# Patient Record
Sex: FEMALE | Race: WHITE | NOT HISPANIC OR LATINO | RURAL
[De-identification: names, ages, dates, MRNs, and addresses within clinical notes are randomized per-mention and may not be internally consistent; named-entity substitution may affect disease eponyms.]

---

## 2024-01-24 ENCOUNTER — OFFICE VISIT (OUTPATIENT)
Dept: BEHAVIORAL HEALTH | Facility: CLINIC | Age: 29
End: 2024-01-24
Payer: COMMERCIAL

## 2024-01-24 VITALS
OXYGEN SATURATION: 98 % | RESPIRATION RATE: 20 BRPM | DIASTOLIC BLOOD PRESSURE: 60 MMHG | BODY MASS INDEX: 38.51 KG/M2 | TEMPERATURE: 98 F | HEIGHT: 69 IN | HEART RATE: 90 BPM | WEIGHT: 260 LBS | SYSTOLIC BLOOD PRESSURE: 110 MMHG

## 2024-01-24 DIAGNOSIS — F90.2 ADHD (ATTENTION DEFICIT HYPERACTIVITY DISORDER), COMBINED TYPE: Primary | ICD-10-CM

## 2024-01-24 DIAGNOSIS — F43.12 CHRONIC POST-TRAUMATIC STRESS DISORDER (PTSD): ICD-10-CM

## 2024-01-24 DIAGNOSIS — F41.1 GENERALIZED ANXIETY DISORDER: ICD-10-CM

## 2024-01-24 LAB

## 2024-01-24 PROCEDURE — 1159F MED LIST DOCD IN RCRD: CPT | Mod: ,,, | Performed by: NURSE PRACTITIONER

## 2024-01-24 PROCEDURE — 90792 PSYCH DIAG EVAL W/MED SRVCS: CPT | Mod: ,,, | Performed by: NURSE PRACTITIONER

## 2024-01-24 PROCEDURE — 3074F SYST BP LT 130 MM HG: CPT | Mod: ,,, | Performed by: NURSE PRACTITIONER

## 2024-01-24 PROCEDURE — 3078F DIAST BP <80 MM HG: CPT | Mod: ,,, | Performed by: NURSE PRACTITIONER

## 2024-01-24 PROCEDURE — 80305 DRUG TEST PRSMV DIR OPT OBS: CPT | Mod: QW,,, | Performed by: NURSE PRACTITIONER

## 2024-01-24 RX ORDER — LISDEXAMFETAMINE DIMESYLATE 30 MG/1
30 CAPSULE ORAL EVERY MORNING
Qty: 30 CAPSULE | Refills: 0 | Status: SHIPPED | OUTPATIENT
Start: 2024-01-24 | End: 2024-01-24

## 2024-01-24 RX ORDER — BUPROPION HYDROCHLORIDE 150 MG/1
150 TABLET, EXTENDED RELEASE ORAL 2 TIMES DAILY
COMMUNITY
Start: 2024-01-12 | End: 2024-01-24

## 2024-01-24 RX ORDER — SERTRALINE HYDROCHLORIDE 100 MG/1
100 TABLET, FILM COATED ORAL
COMMUNITY
Start: 2023-11-17 | End: 2024-01-24 | Stop reason: SDUPTHER

## 2024-01-24 RX ORDER — BUPROPION HYDROCHLORIDE 150 MG/1
150 TABLET ORAL DAILY
Qty: 90 TABLET | Refills: 3 | Status: SHIPPED | OUTPATIENT
Start: 2024-01-24 | End: 2024-05-29

## 2024-01-24 RX ORDER — SERTRALINE HYDROCHLORIDE 100 MG/1
100 TABLET, FILM COATED ORAL DAILY
Qty: 90 TABLET | Refills: 3 | Status: SHIPPED | OUTPATIENT
Start: 2024-01-24 | End: 2025-01-18

## 2024-01-24 RX ORDER — NORETHINDRONE ACETATE AND ETHINYL ESTRADIOL 1MG-20(21)
1 KIT ORAL
COMMUNITY
Start: 2024-01-12 | End: 2024-05-19

## 2024-01-24 RX ORDER — DEXTROAMPHETAMINE SACCHARATE, AMPHETAMINE ASPARTATE MONOHYDRATE, DEXTROAMPHETAMINE SULFATE AND AMPHETAMINE SULFATE 5; 5; 5; 5 MG/1; MG/1; MG/1; MG/1
20 CAPSULE, EXTENDED RELEASE ORAL EVERY MORNING
Qty: 30 CAPSULE | Refills: 0 | Status: SHIPPED | OUTPATIENT
Start: 2024-01-24 | End: 2024-02-23 | Stop reason: SDUPTHER
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
## 2024-01-25 PROBLEM — F90.2 ADHD (ATTENTION DEFICIT HYPERACTIVITY DISORDER), COMBINED TYPE: Status: ACTIVE | Noted: 2024-01-25

## 2024-01-25 PROBLEM — F41.1 GENERALIZED ANXIETY DISORDER: Status: ACTIVE | Noted: 2024-01-25

## 2024-01-25 PROBLEM — F43.12 CHRONIC POST-TRAUMATIC STRESS DISORDER (PTSD): Status: ACTIVE | Noted: 2024-01-25

## 2024-01-26 ENCOUNTER — PATIENT MESSAGE (OUTPATIENT)
Dept: BEHAVIORAL HEALTH | Facility: CLINIC | Age: 29
End: 2024-01-26
Payer: COMMERCIAL

## 2024-02-17 ENCOUNTER — OFFICE VISIT (OUTPATIENT)
Dept: FAMILY MEDICINE | Facility: CLINIC | Age: 29
End: 2024-02-17
Payer: COMMERCIAL

## 2024-02-17 VITALS
HEIGHT: 69 IN | DIASTOLIC BLOOD PRESSURE: 82 MMHG | BODY MASS INDEX: 38.8 KG/M2 | HEART RATE: 114 BPM | RESPIRATION RATE: 18 BRPM | TEMPERATURE: 99 F | OXYGEN SATURATION: 97 % | SYSTOLIC BLOOD PRESSURE: 115 MMHG | WEIGHT: 262 LBS

## 2024-02-17 DIAGNOSIS — U07.1 COVID-19: ICD-10-CM

## 2024-02-17 DIAGNOSIS — Z20.828 EXPOSURE TO SARS-ASSOCIATED CORONAVIRUS: ICD-10-CM

## 2024-02-17 DIAGNOSIS — J02.0 STREPTOCOCCAL SORE THROAT: Primary | ICD-10-CM

## 2024-02-17 LAB
CTP QC/QA: YES
FLUAV AG NPH QL: NEGATIVE
FLUBV AG NPH QL: NEGATIVE
S PYO RRNA THROAT QL PROBE: POSITIVE
SARS-COV-2 AG RESP QL IA.RAPID: POSITIVE

## 2024-02-17 PROCEDURE — 87880 STREP A ASSAY W/OPTIC: CPT | Mod: QW,,, | Performed by: NURSE PRACTITIONER

## 2024-02-17 PROCEDURE — 99051 MED SERV EVE/WKEND/HOLIDAY: CPT | Mod: ,,, | Performed by: NURSE PRACTITIONER

## 2024-02-17 PROCEDURE — 87426 SARSCOV CORONAVIRUS AG IA: CPT | Mod: QW,,, | Performed by: NURSE PRACTITIONER

## 2024-02-17 PROCEDURE — 87804 INFLUENZA ASSAY W/OPTIC: CPT | Mod: 59,QW,, | Performed by: NURSE PRACTITIONER

## 2024-02-17 PROCEDURE — 3008F BODY MASS INDEX DOCD: CPT | Mod: ,,, | Performed by: NURSE PRACTITIONER

## 2024-02-17 PROCEDURE — 3074F SYST BP LT 130 MM HG: CPT | Mod: ,,, | Performed by: NURSE PRACTITIONER

## 2024-02-17 PROCEDURE — 99203 OFFICE O/P NEW LOW 30 MIN: CPT | Mod: ,,, | Performed by: NURSE PRACTITIONER

## 2024-02-17 PROCEDURE — 3079F DIAST BP 80-89 MM HG: CPT | Mod: ,,, | Performed by: NURSE PRACTITIONER

## 2024-02-17 RX ORDER — AZITHROMYCIN 500 MG/1
500 TABLET, FILM COATED ORAL DAILY
Qty: 5 TABLET | Refills: 0 | Status: SHIPPED | OUTPATIENT
Start: 2024-02-17 | End: 2024-02-22

## 2024-02-17 RX ORDER — AZITHROMYCIN 500 MG/1
500 TABLET, FILM COATED ORAL DAILY
Qty: 5 TABLET | Refills: 0 | Status: SHIPPED | OUTPATIENT
Start: 2024-02-17 | End: 2024-02-17

## 2024-02-17 NOTE — PROGRESS NOTES
Subjective:       Patient ID: Xenia Pradhan is a 28 y.o. female.    Chief Complaint: Sore Throat (X1 day) and Otalgia (Left ear pain x 1day)    Sore Throat (X1 day) and Otalgia (Left ear pain x 1day)      Sore Throat   Associated symptoms include ear pain. Pertinent negatives include no abdominal pain, congestion, coughing, headaches, neck pain, shortness of breath or vomiting.   Otalgia   Associated symptoms include a sore throat. Pertinent negatives include no abdominal pain, coughing, headaches, neck pain, rash or vomiting.     Review of Systems   Constitutional:  Negative for appetite change, fatigue and fever.   HENT:  Positive for ear pain and sore throat. Negative for nasal congestion.    Eyes:  Negative for pain, discharge and itching.   Respiratory:  Negative for cough and shortness of breath.    Cardiovascular:  Negative for chest pain and leg swelling.   Gastrointestinal:  Negative for abdominal pain, change in bowel habit, nausea and vomiting.   Musculoskeletal:  Negative for back pain, gait problem and neck pain.   Integumentary:  Negative for rash and wound.   Allergic/Immunologic: Negative for immunocompromised state.   Neurological:  Negative for dizziness, weakness and headaches.   All other systems reviewed and are negative.        Objective:      Physical Exam  Vitals and nursing note reviewed.   Constitutional:       General: She is not in acute distress.     Appearance: Normal appearance. She is not ill-appearing, toxic-appearing or diaphoretic.   HENT:      Head: Normocephalic.      Right Ear: Tympanic membrane, ear canal and external ear normal.      Left Ear: Tympanic membrane, ear canal and external ear normal.      Nose: Congestion present. No rhinorrhea.      Mouth/Throat:      Mouth: Mucous membranes are moist.      Pharynx: Posterior oropharyngeal erythema present. No oropharyngeal exudate.   Eyes:      General: No scleral icterus.        Right eye: No discharge.         Left eye: No  discharge.      Extraocular Movements: Extraocular movements intact.      Conjunctiva/sclera: Conjunctivae normal.      Pupils: Pupils are equal, round, and reactive to light.   Cardiovascular:      Rate and Rhythm: Normal rate and regular rhythm.      Pulses: Normal pulses.      Heart sounds: Normal heart sounds. No murmur heard.  Pulmonary:      Effort: Pulmonary effort is normal. No respiratory distress.      Breath sounds: Normal breath sounds. No wheezing, rhonchi or rales.   Musculoskeletal:         General: Normal range of motion.      Cervical back: Neck supple. Tenderness present.   Lymphadenopathy:      Cervical: Cervical adenopathy present.   Skin:     General: Skin is warm and dry.      Capillary Refill: Capillary refill takes less than 2 seconds.      Findings: No rash.   Neurological:      Mental Status: She is alert and oriented to person, place, and time.   Psychiatric:         Mood and Affect: Mood normal.         Behavior: Behavior normal.         Thought Content: Thought content normal.         Judgment: Judgment normal.            Assessment:       1. Streptococcal sore throat    2. Exposure to SARS-associated coronavirus    3. COVID-19        Plan:   Streptococcal sore throat  -     Discontinue: azithromycin (ZITHROMAX) 500 MG tablet; Take 1 tablet (500 mg total) by mouth once daily. for 5 days  Dispense: 5 tablet; Refill: 0  -     azithromycin (ZITHROMAX) 500 MG tablet; Take 1 tablet (500 mg total) by mouth once daily. for 5 days  Dispense: 5 tablet; Refill: 0    Exposure to SARS-associated coronavirus  -     POCT rapid strep A  -     SARS Coronavirus 2 Antigen, POCT  -     POCT Influenza A/B Rapid Antigen    COVID-19         Risks, benefits, and side effects were discussed with the patient. All questions were answered to the fullest satisfaction of the patient, and pt verbalized understanding and agreement to treatment plan. Pt was to call with any new or worsening symptoms, or present to the  ER

## 2024-02-23 ENCOUNTER — OFFICE VISIT (OUTPATIENT)
Dept: BEHAVIORAL HEALTH | Facility: CLINIC | Age: 29
End: 2024-02-23
Payer: COMMERCIAL

## 2024-02-23 VITALS
BODY MASS INDEX: 37.92 KG/M2 | HEIGHT: 69 IN | HEART RATE: 97 BPM | SYSTOLIC BLOOD PRESSURE: 112 MMHG | OXYGEN SATURATION: 99 % | DIASTOLIC BLOOD PRESSURE: 68 MMHG | RESPIRATION RATE: 20 BRPM | TEMPERATURE: 98 F | WEIGHT: 256 LBS

## 2024-02-23 DIAGNOSIS — F90.2 ADHD (ATTENTION DEFICIT HYPERACTIVITY DISORDER), COMBINED TYPE: ICD-10-CM

## 2024-02-23 DIAGNOSIS — F43.12 CHRONIC POST-TRAUMATIC STRESS DISORDER (PTSD): ICD-10-CM

## 2024-02-23 DIAGNOSIS — F41.1 GENERALIZED ANXIETY DISORDER: Primary | ICD-10-CM

## 2024-02-23 PROCEDURE — 99214 OFFICE O/P EST MOD 30 MIN: CPT | Mod: ,,, | Performed by: NURSE PRACTITIONER

## 2024-02-23 PROCEDURE — 1160F RVW MEDS BY RX/DR IN RCRD: CPT | Mod: ,,, | Performed by: NURSE PRACTITIONER

## 2024-02-23 PROCEDURE — 3078F DIAST BP <80 MM HG: CPT | Mod: ,,, | Performed by: NURSE PRACTITIONER

## 2024-02-23 PROCEDURE — 90833 PSYTX W PT W E/M 30 MIN: CPT | Mod: ,,, | Performed by: NURSE PRACTITIONER

## 2024-02-23 PROCEDURE — 3008F BODY MASS INDEX DOCD: CPT | Mod: ,,, | Performed by: NURSE PRACTITIONER

## 2024-02-23 PROCEDURE — 3074F SYST BP LT 130 MM HG: CPT | Mod: ,,, | Performed by: NURSE PRACTITIONER

## 2024-02-23 PROCEDURE — 1159F MED LIST DOCD IN RCRD: CPT | Mod: ,,, | Performed by: NURSE PRACTITIONER

## 2024-02-23 RX ORDER — DEXTROAMPHETAMINE SACCHARATE, AMPHETAMINE ASPARTATE MONOHYDRATE, DEXTROAMPHETAMINE SULFATE AND AMPHETAMINE SULFATE 5; 5; 5; 5 MG/1; MG/1; MG/1; MG/1
20 CAPSULE, EXTENDED RELEASE ORAL EVERY MORNING
Qty: 30 CAPSULE | Refills: 0 | Status: SHIPPED | OUTPATIENT
Start: 2024-03-25 | End: 2024-03-22

## 2024-02-23 RX ORDER — DEXTROAMPHETAMINE SACCHARATE, AMPHETAMINE ASPARTATE MONOHYDRATE, DEXTROAMPHETAMINE SULFATE AND AMPHETAMINE SULFATE 5; 5; 5; 5 MG/1; MG/1; MG/1; MG/1
20 CAPSULE, EXTENDED RELEASE ORAL EVERY MORNING
Qty: 30 CAPSULE | Refills: 0 | Status: SHIPPED | OUTPATIENT
Start: 2024-02-23 | End: 2024-04-25

## 2024-02-23 RX ORDER — DEXTROAMPHETAMINE SACCHARATE, AMPHETAMINE ASPARTATE MONOHYDRATE, DEXTROAMPHETAMINE SULFATE AND AMPHETAMINE SULFATE 5; 5; 5; 5 MG/1; MG/1; MG/1; MG/1
20 CAPSULE, EXTENDED RELEASE ORAL EVERY MORNING
Qty: 30 CAPSULE | Refills: 0 | Status: SHIPPED | OUTPATIENT
Start: 2024-04-24 | End: 2024-03-22

## 2024-02-23 NOTE — PROGRESS NOTES
Outpatient Psychiatry Follow-Up Visit (MD/NP)    2/23/2024    Clinical Status of Patient:  Outpatient (Ambulatory)    Chief Complaint:  Xenia Pradhan is a 28 y.o. female who presents today for follow-up of depression, anxiety, and attention problems.  Met with patient.      Interim Events/Subjective Report/Content of Current Session: Has had improvement in anxiety/depression symptoms since her last appointment. Feels like the 100 mg of Sertraline combined with Bupropion 150 mg has made a big difference in her overall anxiety and depression. She has also lost 6 lbs since her last appointment which she attributes to her medication regimen helping her history of Binge Eating Disorder.. Her job has gotten increasingly stressful, but she feels like she is managing the stress levels well. She has had substantial improvement in ADHD symptoms, but feels like her symptoms return around lunch time.    Psychotherapy:  Target symptoms: recurrent depression, anxiety , work stress  Why chosen therapy is appropriate versus another modality: relevant to diagnosis, patient responds to this modality  Outcome monitoring methods: self-report, observation, checklist/rating scale  Therapeutic intervention type: behavior modifying psychotherapy  Topics discussed/themes: relationships difficulties, difficulty managing affect in interpersonal relationships, building skills sets for symptom management, symptom recognition  The patient's response to the intervention is accepting. The patient's progress toward treatment goals is good.   Duration of intervention: 21 minutes.    Patient  reviewed this visit.     Review of Systems   PSYCHIATRIC: Pertinant items are noted in the narrative.  CONSTITUTIONAL: No weight gain or loss.   RESPIRATORY: No shortness of breath.  CARDIOVASCULAR: No tachycardia or chest pain.    Past Medical, Family and Social History: The patient's past medical, family and social history have been reviewed and updated as  appropriate within the electronic medical record - see encounter notes.    Compliance: yes    Side effects: None    Risk Parameters:  Patient reports no suicidal ideation  Patient reports no homicidal ideation  Patient reports no self-injurious behavior  Patient reports no violent behavior    Exam (detailed: at least 9 elements; comprehensive: all 15 elements)     PHQ-9: PHQ-9 Questionnaire  Little interest or pleasure in doing things: Not at all  Feeling down, depressed, or hopeless: Not at all  Trouble falling or staying asleep, or sleeping too much: Several days  Feeling tired or having little energy: Several days  Poor appetite or overeating: More than half the days  Feeling bad about yourself - or that you are a failure or have let yourself or your family down: Not at all  Trouble concentrating on things, such as reading the newspaper or watching television: Not at all  Moving or speaking so slowly that other people could have noticed? Or the opposite - being so fidgety or restless that you have been moving around a lot more than usual.: Not at all  Thoughts that you would be better off dead or hurting yourself in some way: Not at all  Patient Health Questionnaire-9 Score: 4    How difficult have these problems made it for you to do your work, take care of things at home, or get along with other people?: Somewhat difficult    GARLAND-7: GARLAND-7 Questionnaire  Feeling nervous, anxious, or on edge: Not at all  Not being able to stop or control worrying: Not at all  Worrying too much about different things: Several days  Trouble relaxing: More than half the days  Being so restless that it is hard to sit still: Not at all  Becoming easily annoyed or irritable: Several days  Feeling afraid as if something awful might happen: Not at all  GARLAND-7 Total Score: 4      Constitutional  Vitals:  Most recent vital signs, dated greater than 90 days prior to this appointment, were reviewed.   Vitals:    02/23/24 0923   BP: 112/68  "  Pulse: 97   Resp: 20   Temp: 97.6 °F (36.4 °C)   SpO2: 99%   Weight: 116.1 kg (256 lb)   Height: 5' 9" (1.753 m)        General:  unremarkable, age appropriate, casually dressed, neatly groomed, obese     Musculoskeletal  Muscle Strength/Tone:  no spasicity, no rigidity, no cogwheeling, no flaccidity, no paratonia, no dyskinesia   Gait & Station:  non-ataxic     Psychiatric  Speech:  no latency; no press   Mood & Affect:  steady  congruent and appropriate   Thought Process:  normal and logical   Associations:  intact   Thought Content:  normal, no suicidality, no homicidality, delusions, or paranoia   Insight:  intact, has awareness of illness   Judgement: behavior is adequate to circumstances   Orientation:  grossly intact   Memory: intact for content of interview   Language: grossly intact   Attention Span & Concentration:  able to focus   Fund of Knowledge:  intact and appropriate to age and level of education, familiar with aspects of current personal life     Assessment and Diagnosis   Status/Progress: Based on the examination today, the patient's problem(s) is/are improved.  New problems have not been presented today.   Co-morbidities, Diagnostic uncertainty, and Lack of compliance are not complicating management of the primary condition.  There are no active rule-out diagnoses for this patient at this time.     General Impression: Has had good symptom improvement in every category. Will increase Dextroamphetamine-amphetamine to 20 mg XR for longer control of ADHD symptoms. Is using coping mechanisms well.      ICD-10-CM ICD-9-CM   1. Generalized anxiety disorder  F41.1 300.02   2. ADHD (attention deficit hyperactivity disorder), combined type  F90.2 314.01   3. Chronic post-traumatic stress disorder (PTSD)  F43.12 309.81       Intervention/Counseling/Treatment Plan   Medication Management: Continue current medications. The risks and benefits of medication were discussed with the patient. Verbalized " understanding.  Counseling provided with patient as follows: importance of compliance with chosen treatment options was emphasized, risks and benefits of treatment options, including medications, were discussed with the patient, risk factor reduction, prognosis, patient education, instructions for  management, treatment, and follow-up were reviewed      Medications:   Medication List with Changes/Refills   New Medications    DEXTROAMPHETAMINE-AMPHETAMINE (ADDERALL XR) 20 MG 24 HR CAPSULE    Take 1 capsule (20 mg total) by mouth every morning.       Start Date: 3/25/2024 End Date: --    DEXTROAMPHETAMINE-AMPHETAMINE (ADDERALL XR) 20 MG 24 HR CAPSULE    Take 1 capsule (20 mg total) by mouth every morning.       Start Date: 2/23/2024 End Date: --   Current Medications    BUPROPION (WELLBUTRIN XL) 150 MG TB24 TABLET    Take 1 tablet (150 mg total) by mouth once daily.       Start Date: 1/24/2024 End Date: 1/18/2025    NORETHINDRONE-ETHINYL ESTRADIOL (JUNEL FE 1/20) 1 MG-20 MCG (21)/75 MG (7) PER TABLET    Take 1 tablet by mouth.       Start Date: 1/12/2024 End Date: --    SERTRALINE (ZOLOFT) 100 MG TABLET    Take 1 tablet (100 mg total) by mouth once daily.       Start Date: 1/24/2024 End Date: 1/18/2025   Changed and/or Refilled Medications    Modified Medication Previous Medication    DEXTROAMPHETAMINE-AMPHETAMINE (ADDERALL XR) 20 MG 24 HR CAPSULE dextroamphetamine-amphetamine (ADDERALL XR) 20 MG 24 hr capsule       Take 1 capsule (20 mg total) by mouth every morning.    Take 1 capsule (20 mg total) by mouth every morning.       Start Date: 4/24/2024 End Date: --    Start Date: 1/24/2024 End Date: 2/23/2024     Return to Clinic: 3 months    Patient instructed to please go to emergency department if feeling as though you are going to harm to yourself or others or if you are in crisis; or to please call the clinic to report any worsening of symptoms or problems associated with medication.     Total Time: 46 minutes

## 2024-03-22 ENCOUNTER — PATIENT MESSAGE (OUTPATIENT)
Dept: BEHAVIORAL HEALTH | Facility: CLINIC | Age: 29
End: 2024-03-22
Payer: COMMERCIAL

## 2024-03-22 DIAGNOSIS — F90.2 ADHD (ATTENTION DEFICIT HYPERACTIVITY DISORDER), COMBINED TYPE: Primary | ICD-10-CM

## 2024-03-22 RX ORDER — DEXTROAMPHETAMINE SACCHARATE, AMPHETAMINE ASPARTATE MONOHYDRATE, DEXTROAMPHETAMINE SULFATE AND AMPHETAMINE SULFATE 6.25; 6.25; 6.25; 6.25 MG/1; MG/1; MG/1; MG/1
25 CAPSULE, EXTENDED RELEASE ORAL EVERY MORNING
Qty: 30 CAPSULE | Refills: 0 | Status: SHIPPED | OUTPATIENT
Start: 2024-04-24 | End: 2024-04-25

## 2024-03-22 RX ORDER — DEXTROAMPHETAMINE SACCHARATE, AMPHETAMINE ASPARTATE MONOHYDRATE, DEXTROAMPHETAMINE SULFATE AND AMPHETAMINE SULFATE 6.25; 6.25; 6.25; 6.25 MG/1; MG/1; MG/1; MG/1
25 CAPSULE, EXTENDED RELEASE ORAL EVERY MORNING
Qty: 30 CAPSULE | Refills: 0 | Status: SHIPPED | OUTPATIENT
Start: 2024-03-25 | End: 2024-04-25

## 2024-04-25 RX ORDER — ATOMOXETINE 40 MG/1
40 CAPSULE ORAL DAILY
Qty: 30 CAPSULE | Refills: 1 | Status: SHIPPED | OUTPATIENT
Start: 2024-04-25 | End: 2024-05-06 | Stop reason: SDUPTHER

## 2024-05-01 ENCOUNTER — PATIENT MESSAGE (OUTPATIENT)
Dept: BEHAVIORAL HEALTH | Facility: CLINIC | Age: 29
End: 2024-05-01
Payer: COMMERCIAL

## 2024-05-06 ENCOUNTER — OFFICE VISIT (OUTPATIENT)
Dept: BEHAVIORAL HEALTH | Facility: CLINIC | Age: 29
End: 2024-05-06
Payer: COMMERCIAL

## 2024-05-06 VITALS
BODY MASS INDEX: 40.88 KG/M2 | TEMPERATURE: 97 F | OXYGEN SATURATION: 98 % | SYSTOLIC BLOOD PRESSURE: 122 MMHG | HEART RATE: 95 BPM | HEIGHT: 69 IN | DIASTOLIC BLOOD PRESSURE: 84 MMHG | RESPIRATION RATE: 16 BRPM | WEIGHT: 276 LBS

## 2024-05-06 DIAGNOSIS — F41.1 GENERALIZED ANXIETY DISORDER: Primary | ICD-10-CM

## 2024-05-06 DIAGNOSIS — F90.2 ADHD (ATTENTION DEFICIT HYPERACTIVITY DISORDER), COMBINED TYPE: ICD-10-CM

## 2024-05-06 DIAGNOSIS — F43.12 CHRONIC POST-TRAUMATIC STRESS DISORDER (PTSD): ICD-10-CM

## 2024-05-06 PROCEDURE — 1159F MED LIST DOCD IN RCRD: CPT | Mod: ,,, | Performed by: NURSE PRACTITIONER

## 2024-05-06 PROCEDURE — 1160F RVW MEDS BY RX/DR IN RCRD: CPT | Mod: ,,, | Performed by: NURSE PRACTITIONER

## 2024-05-06 PROCEDURE — 99214 OFFICE O/P EST MOD 30 MIN: CPT | Mod: ,,, | Performed by: NURSE PRACTITIONER

## 2024-05-06 PROCEDURE — 90833 PSYTX W PT W E/M 30 MIN: CPT | Mod: ,,, | Performed by: NURSE PRACTITIONER

## 2024-05-06 PROCEDURE — 3079F DIAST BP 80-89 MM HG: CPT | Mod: ,,, | Performed by: NURSE PRACTITIONER

## 2024-05-06 PROCEDURE — 3008F BODY MASS INDEX DOCD: CPT | Mod: ,,, | Performed by: NURSE PRACTITIONER

## 2024-05-06 PROCEDURE — 3074F SYST BP LT 130 MM HG: CPT | Mod: ,,, | Performed by: NURSE PRACTITIONER

## 2024-05-06 RX ORDER — CLORAZEPATE DIPOTASSIUM 3.75 MG/1
3.75 TABLET ORAL 3 TIMES DAILY PRN
Qty: 60 TABLET | Refills: 0 | Status: SHIPPED | OUTPATIENT
Start: 2024-05-06 | End: 2024-05-29

## 2024-05-06 RX ORDER — ATOMOXETINE 40 MG/1
40 CAPSULE ORAL 2 TIMES DAILY
Qty: 60 CAPSULE | Refills: 2 | Status: SHIPPED | OUTPATIENT
Start: 2024-05-06

## 2024-05-06 RX ORDER — SERTRALINE HYDROCHLORIDE 50 MG/1
50 TABLET, FILM COATED ORAL DAILY
Qty: 90 TABLET | Refills: 3 | Status: SHIPPED | OUTPATIENT
Start: 2024-05-06 | End: 2025-05-06

## 2024-05-06 NOTE — PROGRESS NOTES
Outpatient Psychiatry Follow-Up Visit (MD/NP)    5/6/2024    Clinical Status of Patient:  Outpatient (Ambulatory)    Chief Complaint:  Xenia Pradhan is a 28 y.o. female who presents today for follow-up of depression and anxiety.  Met with patient.      Interim Events/Subjective Report/Content of Current Session: Has had a really big increase in anxiety.  joined the  and now he is gone. He won't return for a couple of months. She is concerned about his absence. He graduates June 6th and then he goes to [employer] in [place] and he won't return until August. She can see him in 2 days while she is in [place] and then she won't see him again. She is very upset with him being gone.     Psychotherapy:  Target symptoms: depression, anxiety   Why chosen therapy is appropriate versus another modality: relevant to diagnosis, patient responds to this modality  Outcome monitoring methods: self-report, observation, checklist/rating scale  Therapeutic intervention type: supportive psychotherapy  Topics discussed/themes: relationships difficulties, work stress, parenting issues, difficulty managing affect in interpersonal relationships, building skills sets for symptom management, symptom recognition  The patient's response to the intervention is accepting. The patient's progress toward treatment goals is good.   Duration of intervention: 22 minutes.      Patient  reviewed this visit.     Review of Systems   PSYCHIATRIC: Pertinant items are noted in the narrative.  CONSTITUTIONAL: No weight gain or loss.   RESPIRATORY: No shortness of breath.  CARDIOVASCULAR: No tachycardia or chest pain.    Past Medical, Family and Social History: The patient's past medical, family and social history have been reviewed and updated as appropriate within the electronic medical record - see encounter notes.    Compliance: yes    Side effects: None    Risk Parameters:  Patient reports no suicidal ideation  Patient reports no  homicidal ideation  Patient reports no self-injurious behavior  Patient reports no violent behavior    Exam (detailed: at least 9 elements; comprehensive: all 15 elements)     GARLAND-7 Questionnaire  Feeling nervous, anxious, or on edge: Nearly every day  Not being able to stop or control worrying: More than half the days  Worrying too much about different things: Several days  Trouble relaxing: More than half the days  Being so restless that it is hard to sit still: Several days  Becoming easily annoyed or irritable: Nearly every day  Feeling afraid as if something awful might happen: More than half the days  GARLAND-7 Total Score: 14           5/6/2024     4:36 PM   MDQ Scale   you felt so good or so hyper that other people thought you were not your normal self or you were so hyper that you got into trouble? 0   you were so irritable that you shouted at people or started fights or arguments? 1   you felt much more self-confident than usual? 1   you got much less sleep than usual and found that you didn't really miss it? 1   you were more talkative or spoke much faster than usual? 1   thoughts raced through your head or you couldn't slow your mind down? 1   you were so easily distracted by things around you that you had trouble concentrating or staying on track? 1   you had more energy than usual? 0   you were much more active or did many more things than usual? 0   you were much more social or outgoing than usual, for example, you telephoned friends in the middle of the night? 0   you were much more interested in sex than usual? 0   you did things that were unusual for you or that other people might have thought were excessive, foolish, or risky? 0   spending money got you or your family in trouble? 0   If you checked YES to more than one of the above, have several of these ever happened during the same period of time? 1   How much of a problem did any of these cause you - like being unable to work; having family, money  or legal troubles; getting into arguments or fights? Moderate problem   Mood Disorder Questionnaire Score  6         5/6/2024     4:38 PM   Adult ADHD Self-Report Scale   How often do you have trouble wrapping up the final details of a project once the chanllenging parts have been done? 1   How often do you have difficulty getting things in order when you have to do a task that requires organization? 3   How often do you have problems remembering appointments or obligations? 2   When you have a task that requires a lot of thought, how often do you avoid or delay getting started? 3   How often do you fidget or squirm with your hands or feet when you have to sit down for a long time? 2   How often do you feel overly active and compelled to do things, like you were driven by a motor? 2   Part A Score 13   How often do you make careless mistakes when you have to work on a boring or difficult project? 2   How often do you have difficulty keeping your attention when you are doing boring or repetitive work? 4   How often do you have difficulty concentrating on what people say to you, even when they are speaking to you directly? 2   How often do you misplace or have difficulty finding things at home or at work? 2   How often are you distracted by activity or noise around you? 3   How often do you leave your seat in meetings or other situations in which you are expected to remain seated? 2   How often do you feel restless or fidgety? 2   How often do you have difficulty unwinding and relaxing when you have time to yourself? 2   How often do you find yourself talking too much when you are in social situations? 2   When you're in a conversation, how often do you find yourself finishing the sentences of the people you are talking to before they can finish them themselves? 2   How often do you have difficulty waiting your turn in situations when turn taking is required? 1   How often do you interrupt others when they are busy? 1  "  Part B Score 25         Constitutional  Vitals:  Most recent vital signs, dated greater than 90 days prior to this appointment, were reviewed.   Vitals:    05/06/24 1623   BP: 122/84   Pulse: 95   Resp: 16   Temp: 97.2 °F (36.2 °C)   SpO2: 98%   Weight: 125.2 kg (276 lb)   Height: 5' 9" (1.753 m)        General:  unremarkable, age appropriate, older than stated age, obese     Musculoskeletal  Muscle Strength/Tone:  no spasicity, no rigidity, no cogwheeling, no flaccidity, no paratonia   Gait & Station:  non-ataxic     Psychiatric  Speech:  no latency; no press   Mood & Affect:  anxious, sad  congruent and appropriate   Thought Process:  normal and logical   Associations:  intact   Thought Content:  normal, no suicidality, no homicidality, delusions, or paranoia   Insight:  intact, has awareness of illness   Judgement: behavior is adequate to circumstances   Orientation:  grossly intact   Memory: intact for content of interview   Language: grossly intact   Attention Span & Concentration:  able to focus   Fund of Knowledge:  intact and appropriate to age and level of education, familiar with aspects of current personal life     Assessment and Diagnosis   Status/Progress: Based on the examination today, the patient's problem(s) is/are adequately but not ideally controlled.  New problems have not been presented today.   Co-morbidities, Diagnostic uncertainty, and Lack of compliance are not complicating management of the primary condition.  There are no active rule-out diagnoses for this patient at this time.     General Impression: Is having some adjustment to her 's absence and this will likely improve after his schooling is over in August. Will increase medication regimen and begin clorazepate to compensate with the increase in anxiety.       ICD-10-CM ICD-9-CM   1. Generalized anxiety disorder  F41.1 300.02   2. Chronic post-traumatic stress disorder (PTSD)  F43.12 309.81   3. ADHD (attention deficit " hyperactivity disorder), combined type  F90.2 314.01       Intervention/Counseling/Treatment Plan   Medication Management: Continue current medications. The risks and benefits of medication were discussed with the patient. Verbalized understanding.  Counseling provided with patient as follows: importance of compliance with chosen treatment options was emphasized, risks and benefits of treatment options, including medications, were discussed with the patient, risk factor reduction, prognosis, patient education, instructions for  management, treatment, and follow-up were reviewed      Medications:   Medication List with Changes/Refills   New Medications    CLORAZEPATE (TRANXENE) 3.75 MG TAB    Take 1 tablet (3.75 mg total) by mouth 3 (three) times daily as needed (anxiety).       Start Date: 5/6/2024  End Date: 5/26/2024    SERTRALINE (ZOLOFT) 50 MG TABLET    Take 1 tablet (50 mg total) by mouth once daily. Take with 100 mg tablet for 150 mg total.       Start Date: 5/6/2024  End Date: 5/6/2025   Current Medications    BUPROPION (WELLBUTRIN XL) 150 MG TB24 TABLET    Take 1 tablet (150 mg total) by mouth once daily.       Start Date: 1/24/2024 End Date: 1/18/2025    NORETHINDRONE-ETHINYL ESTRADIOL (JUNEL FE 1/20) 1 MG-20 MCG (21)/75 MG (7) PER TABLET    Take 1 tablet by mouth.       Start Date: 1/12/2024 End Date: --    SERTRALINE (ZOLOFT) 100 MG TABLET    Take 1 tablet (100 mg total) by mouth once daily.       Start Date: 1/24/2024 End Date: 1/18/2025   Changed and/or Refilled Medications    Modified Medication Previous Medication    ATOMOXETINE (STRATTERA) 40 MG CAPSULE atomoxetine (STRATTERA) 40 MG capsule       Take 1 capsule (40 mg total) by mouth 2 (two) times a day.    Take 1 capsule (40 mg total) by mouth once daily.       Start Date: 5/6/2024  End Date: --    Start Date: 4/25/2024 End Date: 5/6/2024     Return to Clinic: 3 months    Patient instructed to please go to emergency department if feeling as though  you are going to harm to yourself or others or if you are in crisis; or to please call the clinic to report any worsening of symptoms or problems associated with medication.     Total Time: 48 minutes

## 2024-05-29 ENCOUNTER — OFFICE VISIT (OUTPATIENT)
Dept: FAMILY MEDICINE | Facility: CLINIC | Age: 29
End: 2024-05-29
Payer: COMMERCIAL

## 2024-05-29 VITALS
OXYGEN SATURATION: 97 % | BODY MASS INDEX: 39.55 KG/M2 | DIASTOLIC BLOOD PRESSURE: 82 MMHG | TEMPERATURE: 99 F | HEIGHT: 69 IN | RESPIRATION RATE: 20 BRPM | WEIGHT: 267 LBS | SYSTOLIC BLOOD PRESSURE: 127 MMHG | HEART RATE: 76 BPM

## 2024-05-29 DIAGNOSIS — Z11.59 NEED FOR HEPATITIS C SCREENING TEST: ICD-10-CM

## 2024-05-29 DIAGNOSIS — E55.9 VITAMIN D DEFICIENCY: ICD-10-CM

## 2024-05-29 DIAGNOSIS — Z13.1 SCREENING FOR DIABETES MELLITUS: ICD-10-CM

## 2024-05-29 DIAGNOSIS — R94.6 THYROID FUNCTION STUDY ABNORMALITY: ICD-10-CM

## 2024-05-29 DIAGNOSIS — Z11.4 SCREENING FOR HIV (HUMAN IMMUNODEFICIENCY VIRUS): ICD-10-CM

## 2024-05-29 DIAGNOSIS — E66.9 OBESITY, CLASS II, BMI 35-39.9, NO COMORBIDITY: Chronic | ICD-10-CM

## 2024-05-29 DIAGNOSIS — Z79.899 ENCOUNTER FOR LONG-TERM (CURRENT) USE OF OTHER MEDICATIONS: ICD-10-CM

## 2024-05-29 DIAGNOSIS — R00.2 PALPITATIONS: ICD-10-CM

## 2024-05-29 DIAGNOSIS — Z00.00 ROUTINE GENERAL MEDICAL EXAMINATION AT A HEALTH CARE FACILITY: Primary | ICD-10-CM

## 2024-05-29 DIAGNOSIS — Z13.220 SCREENING FOR LIPOID DISORDERS: ICD-10-CM

## 2024-05-29 PROBLEM — F90.2 ADHD (ATTENTION DEFICIT HYPERACTIVITY DISORDER), COMBINED TYPE: Chronic | Status: ACTIVE | Noted: 2024-01-25

## 2024-05-29 PROBLEM — F43.12 CHRONIC POST-TRAUMATIC STRESS DISORDER (PTSD): Chronic | Status: ACTIVE | Noted: 2024-01-25

## 2024-05-29 PROBLEM — U07.1 COVID-19: Status: RESOLVED | Noted: 2024-02-17 | Resolved: 2024-05-29

## 2024-05-29 PROBLEM — F41.1 GENERALIZED ANXIETY DISORDER: Chronic | Status: ACTIVE | Noted: 2024-01-25

## 2024-05-29 PROBLEM — Z20.828 EXPOSURE TO SARS-ASSOCIATED CORONAVIRUS: Status: RESOLVED | Noted: 2024-02-17 | Resolved: 2024-05-29

## 2024-05-29 PROBLEM — J02.0 STREPTOCOCCAL SORE THROAT: Status: RESOLVED | Noted: 2024-02-17 | Resolved: 2024-05-29

## 2024-05-29 PROCEDURE — 1159F MED LIST DOCD IN RCRD: CPT | Mod: ,,, | Performed by: NURSE PRACTITIONER

## 2024-05-29 PROCEDURE — 3008F BODY MASS INDEX DOCD: CPT | Mod: ,,, | Performed by: NURSE PRACTITIONER

## 2024-05-29 PROCEDURE — 99395 PREV VISIT EST AGE 18-39: CPT | Mod: ,,, | Performed by: NURSE PRACTITIONER

## 2024-05-29 PROCEDURE — 3079F DIAST BP 80-89 MM HG: CPT | Mod: ,,, | Performed by: NURSE PRACTITIONER

## 2024-05-29 PROCEDURE — 3074F SYST BP LT 130 MM HG: CPT | Mod: ,,, | Performed by: NURSE PRACTITIONER

## 2024-05-29 PROCEDURE — 1160F RVW MEDS BY RX/DR IN RCRD: CPT | Mod: ,,, | Performed by: NURSE PRACTITIONER

## 2024-05-29 NOTE — PROGRESS NOTES
[facility] - FAMILY MEDICINE       PATIENT NAME: Xenia Pradhan   : 1995    AGE: 28 y.o. DATE OF ENCOUNTER: 24    MRN: [id number]    Subjective     Patient ID: Xenia Pradhan is a 28 y.o. female.    Chief Complaint: Establish Care (Patient presents to clinic to establish care.) and Health Maintenance ([facility] for . /Tetanus vaccine had done in  at end of the year.)    HPI  Presents to establish care. Eligible for HY wellness visit, will RTC fasting in am for labs.    Has problems losing weight; wt fluctuating since senior in high school.   Recent palps w/ resting HR in 90s but in the past few days back down to 70s.  Saw cardiology when in college for same and was told stress/anxiety.    Went to Weight Management for about 6 mths.  Wellbutrin helped but not sure if it contributed to palps so she stopped it; it helps with cravings and desire to binge eat.  Barrington Cheatham NP had her on adderall for ADHD, but she stopped it due to heart racing & changed to strattera; for anxiety he recently added 50 mg sertraline for total 150 mg daily; added tranxene prn.    Review of Systems   Constitutional:  Negative for chills and fever.   HENT: Negative.     Respiratory: Negative.     Cardiovascular:  Positive for palpitations. Negative for chest pain, leg swelling and claudication.   Gastrointestinal: Negative.    Genitourinary: Negative.    Musculoskeletal: Negative.    Integumentary:  Negative.   Neurological: Negative.    Psychiatric/Behavioral:  Positive for decreased concentration. Negative for dysphoric mood, self-injury and suicidal ideas. The patient is not nervous/anxious.        Tobacco Use: Low Risk  (2024)    Patient History     Smoking Tobacco Use: Never     Smokeless Tobacco Use: Never     Passive Exposure: Never     Review of patient's allergies indicates:   Allergen Reactions    Keflex [cephalexin] Rash     Current Outpatient Medications   Medication  "Instructions    atomoxetine (STRATTERA) 40 mg, Oral, 2 times daily    clorazepate (TRANXENE) 3.75 mg, Oral, 3 times daily PRN    sertraline (ZOLOFT) 100 mg, Oral, Daily    sertraline (ZOLOFT) 50 mg, Oral, Daily, Take with 100 mg tablet for 150 mg total.     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      Vitamin D deficiency 05/29/2024     Health Maintenance Topics with due status: Not Due       Topic Last Completion Date    TETANUS VACCINE 10/01/2022    Influenza Vaccine Not Due     Immunization History   Administered Date(s) Administered    Tdap 10/01/2022       Objective     Body mass index is 39.43 kg/m².  Wt Readings from Last 3 Encounters:   05/29/24 121.1 kg (267 lb)   05/06/24 125.2 kg (276 lb)   02/23/24 116.1 kg (256 lb)     Ht Readings from Last 3 Encounters:   05/29/24 5' 9" (1.753 m)   05/06/24 5' 9" (1.753 m)   02/23/24 5' 9" (1.753 m)     BP Readings from Last 3 Encounters:   05/29/24 127/82   05/06/24 122/84   02/23/24 112/68     Temp Readings from Last 3 Encounters:   05/29/24 98.6 °F (37 °C) (Oral)   05/06/24 97.2 °F (36.2 °C)   02/23/24 97.6 °F (36.4 °C)     Pulse Readings from Last 3 Encounters:   05/29/24 76   05/06/24 95   02/23/24 97     Resp Readings from Last 3 Encounters:   05/29/24 20   05/06/24 16   02/23/24 20     PF Readings from Last 3 Encounters:   No data found for PF       Physical Exam  Vitals and nursing note reviewed.   Constitutional:       General: She is not in acute distress.     Appearance: Normal appearance. She " is not ill-appearing.   HENT:      Head: Normocephalic.      Right Ear: Tympanic membrane, ear canal and external ear normal.      Left Ear: Tympanic membrane, ear canal and external ear normal.      Nose: Nose normal.      Mouth/Throat:      Mouth: Mucous membranes are moist.      Pharynx: Oropharynx is clear. Uvula midline. No posterior oropharyngeal erythema or uvula swelling.   Eyes:      Conjunctiva/sclera: Conjunctivae normal.      Pupils: Pupils are equal, round, and reactive to light.   Neck:      Thyroid: No thyroid mass or thyromegaly.      Vascular: Normal carotid pulses. No carotid bruit.   Cardiovascular:      Rate and Rhythm: Normal rate and regular rhythm.      Pulses: Normal pulses.      Heart sounds: Normal heart sounds.   Pulmonary:      Effort: Pulmonary effort is normal. No respiratory distress.      Breath sounds: Normal breath sounds. No wheezing, rhonchi or rales.   Abdominal:      Palpations: Abdomen is soft.      Tenderness: There is no abdominal tenderness.   Musculoskeletal:      Cervical back: Neck supple.      Right lower leg: No edema.      Left lower leg: No edema.   Lymphadenopathy:      Cervical: No cervical adenopathy.   Skin:     General: Skin is warm and dry.      Capillary Refill: Capillary refill takes less than 2 seconds.      Coloration: Skin is not jaundiced or pale.   Neurological:      General: No focal deficit present.      Mental Status: She is alert and oriented to person, place, and time.      Gait: Gait normal.   Psychiatric:         Mood and Affect: Mood normal.         Behavior: Behavior normal.         Assessment and Plan     1. Routine general medical examination at a health care facility    2. Screening for diabetes mellitus  -     Hemoglobin A1C; Future; Expected date: 05/30/2024    3. Screening for lipoid disorders  -     Lipid Panel; Future; Expected date: 05/30/2024    4. Obesity, Class II, BMI 35-39.9, no comorbidity  Assessment & Plan:  Wt Readings from Last  3 Encounters:   05/29/24 1358 121.1 kg (267 lb)   05/06/24 1623 125.2 kg (276 lb)   02/23/24 0923 116.1 kg (256 lb)     BMI Readings from Last 3 Encounters:   05/29/24 39.43 kg/m²   05/06/24 40.76 kg/m²   02/23/24 37.80 kg/m²     Obesity complicates all aspects of disease management from diagnostic modalities to treatment. Weight loss encouraged and discussed pharmacological treatments that are options in conjunction with therapeutic lifestyle changes if covered by insurance.    Advised would not recommend Adipex or Qysmia due to her history of palpitations.  She has tried Contrave without long term benefit compared to cost of med and was unable to titrate dose > 2 tabs per day.    Suggested considering Saxenda [though currently on nationwide backorder], Wegovy or Zepbound if covered by insurance to use in conjunction with healthy, reduced calorie diet and exercise.     Patient has experienced weight regain following initial success on lifestyle therapy alone and weight tends to fluctuate up & down.  In accordance with ACE/AACE guidelines, she would greatly benefit from adding medication management with lifestyle therapy.    Goal: to lose 5% of body weight or 13.5 lbs in the first 12 wks of treatment and a long term weight loss goal of 10% or more body weight to improve and prevent weight related co-morbidities and overall health and well-being.    If med is started, would need monthly weight management visit for monitoring risk vs benefit, dosage titration, and progress monitoring until reaching maintenance dose and assured tolerance of med.    Orders:  -     Hemoglobin A1C; Future; Expected date: 05/30/2024    5. Vitamin D deficiency  Assessment & Plan:   Latest Reference Range & Units 04/20/23 09:20   Vitamin D ng/mL 19.3     Recheck Vit D level.    Orders:  -     Vitamin D; Future; Expected date: 05/30/2024    6. Thyroid function study abnormality  Assessment & Plan:  Lab Results   Component Value Date    TSH  2.480 04/20/2023    [id number] 121 04/20/2023    FREET4 0.69 (L) 04/20/2023     Recheck thyroid function.    Orders:  -     T4, Free; Future; Expected date: 05/30/2024  -     TSH; Future; Expected date: 05/30/2024  -     T3, Free; Future; Expected date: 05/30/2024    7. Palpitations  Overview:  Eval per cardiology for palps when in college and was attributed to stress/anxiety.    Assessment & Plan:  Recent palps w/ resting HR in 90s currently resolved with HR in 70s.    Orders:  -     Comprehensive Metabolic Panel; Future; Expected date: 05/30/2024  -     T4, Free; Future; Expected date: 05/30/2024  -     TSH; Future; Expected date: 05/30/2024  -     T3, Free; Future; Expected date: 05/30/2024    8. Screening for HIV (human immunodeficiency virus)  -     HIV 1/2 Ag/Ab (4th Gen); Future; Expected date: 05/30/2024    9. Need for hepatitis C screening test  -     Hepatitis C Antibody; Future; Expected date: 05/30/2024    10. Encounter for long-term (current) use of other medications  -     CBC Auto Differential; Future; Expected date: 05/30/2024  -     Comprehensive Metabolic Panel; Future; Expected date: 05/30/2024  -     Hemoglobin A1C; Future; Expected date: 05/30/2024       Plan:   Screening labs plus any other labs needed for long term monitoring.  Additional treatment plan as above.  Has appt w/ GYN Dr. Bhardwaj for 6/19/24.        I have reviewed the medications, allergies, and problem list.     Goal Actions:    What type of visit is the patient here for today?: Healthy You  Does the patient consent to enroll in [facility] Healthy?:  (no [facility] benefits)  Is this a Wellness Follow Up?: No  What is your overall wellness goal? (select at least one): Lose weight  Choose 3: Biometric, Lifestyle, Nutrition  Biometric Actions: BMI>30 lose 1-2 lbs per week  Lifestyle Actions : Take medications as prescribed  Nurtrition Actions: Recommend weight loss      Follow up in about 1 year (around 6/2/2025) for Healthy You, with fasting  labs; sooner f/u if needed &/or if wt loss med started.    Signature:  MANUEL Jaime-BC    Future Appointments   Date Time Provider Department Center   8/12/2024  3:30 PM [facility]   6/2/2025  3:00 PM Olga Adam FNP [facility]

## 2024-05-29 NOTE — ASSESSMENT & PLAN NOTE
Lab Results   Component Value Date    TSH 2.480 04/20/2023    [id number] 121 04/20/2023    FREET4 0.69 (L) 04/20/2023     Recheck thyroid function.

## 2024-05-29 NOTE — ASSESSMENT & PLAN NOTE
Wt Readings from Last 3 Encounters:   05/29/24 1358 121.1 kg (267 lb)   05/06/24 1623 125.2 kg (276 lb)   02/23/24 0923 116.1 kg (256 lb)     BMI Readings from Last 3 Encounters:   05/29/24 39.43 kg/m²   05/06/24 40.76 kg/m²   02/23/24 37.80 kg/m²     Obesity complicates all aspects of disease management from diagnostic modalities to treatment. Weight loss encouraged and discussed pharmacological treatments that are options in conjunction with therapeutic lifestyle changes if covered by insurance.    Advised would not recommend Adipex or Qysmia due to her history of palpitations.  She has tried Contrave without long term benefit compared to cost of med and was unable to titrate dose > 2 tabs per day.    Suggested considering Saxenda [though currently on nationwide backorder], Wegovy or Zepbound if covered by insurance to use in conjunction with healthy, reduced calorie diet and exercise.     Patient has experienced weight regain following initial success on lifestyle therapy alone and weight tends to fluctuate up & down.  In accordance with ACE/AACE guidelines, she would greatly benefit from adding medication management with lifestyle therapy.    Goal: to lose 5% of body weight or 13.5 lbs in the first 12 wks of treatment and a long term weight loss goal of 10% or more body weight to improve and prevent weight related co-morbidities and overall health and well-being.    If med is started, would need monthly weight management visit for monitoring risk vs benefit, dosage titration, and progress monitoring until reaching maintenance dose and assured tolerance of med.

## 2024-06-03 ENCOUNTER — PATIENT MESSAGE (OUTPATIENT)
Dept: FAMILY MEDICINE | Facility: CLINIC | Age: 29
End: 2024-06-03
Payer: COMMERCIAL

## 2024-06-11 ENCOUNTER — PATIENT MESSAGE (OUTPATIENT)
Dept: FAMILY MEDICINE | Facility: CLINIC | Age: 29
End: 2024-06-11
Payer: COMMERCIAL

## 2024-08-01 ENCOUNTER — PATIENT MESSAGE (OUTPATIENT)
Dept: FAMILY MEDICINE | Facility: CLINIC | Age: 29
End: 2024-08-01
Payer: COMMERCIAL

## 2024-08-01 DIAGNOSIS — E66.9 OBESITY, CLASS II, BMI 35-39.9, NO COMORBIDITY: Primary | Chronic | ICD-10-CM

## 2024-08-07 RX ORDER — SEMAGLUTIDE 0.5 MG/.5ML
0.5 INJECTION, SOLUTION SUBCUTANEOUS
Qty: 2 ML | Refills: 0 | Status: SHIPPED | OUTPATIENT
Start: 2024-08-07

## 2024-08-08 NOTE — ASSESSMENT & PLAN NOTE
Wt Readings from Last 3 Encounters:   05/29/24 1358 121.1 kg (267 lb)   05/06/24 1623 125.2 kg (276 lb)   02/23/24 0923 116.1 kg (256 lb)     BMI Readings from Last 3 Encounters:   05/29/24 39.43 kg/m²   05/06/24 40.76 kg/m²   02/23/24 37.80 kg/m²     Obesity complicates all aspects of disease management from diagnostic modalities to treatment. Weight loss encouraged and discussed pharmacological treatments that are options in conjunction with therapeutic lifestyle changes if covered by insurance.      Past treatments tried include:  -Weight management clinic supervision for approximately 6 months.  -Sessions with a therapist/nutritionist specializing in eating disorders who recommended a low calorie (7406-2025) diet which was not successful and unable to maintain.  -Stimulants in the past for ADHD (Mydayis, Adderall, and Vyvanse) without significant weight loss 5 lbs max) and cannot take stimulants due to causing increased BP and palpitations.  Therefore, would not recommend Adipex or Qysmia.    -Took Contrave twice in past without long term benefit compared to cost of med and was unable to titrate dose > 2 tabs per day due to side effects.  -Wellbutrin/bupropion helped with cravings and binge eating, but contributed to palps.     Saxenda not available to due medication availability issues related to nationwide backorder.  Recommend Wegovy or Zepbound if covered by insurance to use in conjunction with healthy, reduced calorie diet and exercise.     Patient has experienced weight regain following initial success on lifestyle therapy alone and weight tends to fluctuate up & down; is unable to maintain weight loss.  In accordance with ACE/AACE guidelines, she would greatly benefit from adding medication management with lifestyle therapy.    Goal: to lose 5% of body weight or 13.5 lbs in the first 12 wks of treatment and a long term weight loss goal of 10% or more body weight to improve and prevent weight related  co-morbidities and overall health and well-being.    If med is started, would need monthly weight management visit for monitoring risk vs benefit, dosage titration, and progress monitoring until reaching maintenance dose and assured tolerance of med.

## 2024-08-30 ENCOUNTER — PATIENT MESSAGE (OUTPATIENT)
Dept: BEHAVIORAL HEALTH | Facility: CLINIC | Age: 29
End: 2024-08-30
Payer: COMMERCIAL

## 2024-09-03 ENCOUNTER — PATIENT MESSAGE (OUTPATIENT)
Dept: FAMILY MEDICINE | Facility: CLINIC | Age: 29
End: 2024-09-03
Payer: COMMERCIAL

## 2024-09-20 ENCOUNTER — TELEPHONE (OUTPATIENT)
Dept: OBSTETRICS AND GYNECOLOGY | Facility: CLINIC | Age: 29
End: 2024-09-20
Payer: COMMERCIAL

## 2024-09-20 DIAGNOSIS — Z34.01 ENCOUNTER FOR PRENATAL CARE OF FIRST PREGNANCY, FIRST TRIMESTER: Primary | ICD-10-CM

## 2024-09-20 NOTE — TELEPHONE ENCOUNTER
----- Message from Hamida Richter MA sent at 9/20/2024  9:27 AM CDT -----  Who Called: Xenia Pradhan    Caller is requesting a sooner appointment. Caller declined first available appointment listed below. Caller will not accept being placed on the waitlist and is requesting a message be sent to doctor.    When is the first available appointment?10-7      Preferred Method of Contact: Phone Call  Patient's Preferred Phone Number on File: [phone]   Best Call Back Number, if different:   Additional Information: Positive preg test, last preg she had a miss carriage early on, wants to come in and do blood work

## 2024-09-23 ENCOUNTER — TELEPHONE (OUTPATIENT)
Dept: OBSTETRICS AND GYNECOLOGY | Facility: CLINIC | Age: 29
End: 2024-09-23
Payer: OTHER GOVERNMENT

## 2024-09-23 DIAGNOSIS — Z32.01 POSITIVE URINE PREGNANCY TEST: Primary | ICD-10-CM

## 2024-09-23 DIAGNOSIS — Z87.59 HISTORY OF MOLAR PREGNANCY: ICD-10-CM

## 2024-09-23 NOTE — TELEPHONE ENCOUNTER
----- Message from Amy Baker MA sent at 9/23/2024  8:40 AM CDT -----  Regarding: pt asking to have a Beta draw  Pt is asking if she can come in and get a Beta Draw, her last pregnancy was a partial molar- her appt isnt til 10/7/24- her phone number is [phone] leave message if no answer.

## 2024-09-24 ENCOUNTER — TELEPHONE (OUTPATIENT)
Dept: OBSTETRICS AND GYNECOLOGY | Facility: CLINIC | Age: 29
End: 2024-09-24
Payer: OTHER GOVERNMENT

## 2024-09-24 DIAGNOSIS — Z87.59 HISTORY OF MOLAR PREGNANCY: Primary | ICD-10-CM

## 2024-09-24 NOTE — TELEPHONE ENCOUNTER
----- Message from Hamida Richter MA sent at 9/23/2024  4:56 PM CDT -----  Who Called: Xenia Pradhan    Caller is requesting information on test results.    Name of Test (Lab/Mammo/Etc): HCG      Preferred Method of Contact: Phone Call  Patient's Preferred Phone Number on File: [phone]   Best Call Back Number, if different:  Additional Information: test results

## 2024-09-25 ENCOUNTER — TELEPHONE (OUTPATIENT)
Dept: OBSTETRICS AND GYNECOLOGY | Facility: CLINIC | Age: 29
End: 2024-09-25
Payer: OTHER GOVERNMENT

## 2024-09-25 NOTE — TELEPHONE ENCOUNTER
----- Message from Hamida Richter MA sent at 9/25/2024 10:29 AM CDT -----  Who Called: Xenia Pradhan    Caller is requesting information on test results.    Name of Test (Lab/Mammo/Etc): HCG blood draw    Preferred Method of Contact: Phone Call  Patient's Preferred Phone Number on File: [phone]   Best Call Back Number, if different:  Additional Information: results of hcg

## 2024-10-22 ENCOUNTER — TELEPHONE (OUTPATIENT)
Dept: OBSTETRICS AND GYNECOLOGY | Facility: CLINIC | Age: 29
End: 2024-10-22
Payer: OTHER GOVERNMENT

## 2024-10-22 DIAGNOSIS — Z32.01 POSITIVE URINE PREGNANCY TEST: Primary | ICD-10-CM

## 2024-10-24 ENCOUNTER — PATIENT MESSAGE (OUTPATIENT)
Dept: LAB | Facility: CLINIC | Age: 29
End: 2024-10-24
Payer: OTHER GOVERNMENT

## 2024-10-24 ENCOUNTER — TELEPHONE (OUTPATIENT)
Dept: OBSTETRICS AND GYNECOLOGY | Facility: CLINIC | Age: 29
End: 2024-10-24
Payer: OTHER GOVERNMENT

## 2024-10-24 DIAGNOSIS — Z32.01 POSITIVE URINE PREGNANCY TEST: Primary | ICD-10-CM

## 2024-10-24 NOTE — TELEPHONE ENCOUNTER
----- Message from Iman sent at 10/24/2024  8:57 AM CDT -----  Regarding: result/ order for repeat labs also  Who Called: Xenia Pradhan    Caller is requesting information on test results.    Name of Test (Lab/Mammo/Etc): lab  Date of Test: 10/22  Where the test was performed: [facility]  Ordering Provider: Sandee    Preferred Method of Contact: Phone Call  Patient's Preferred Phone Number on File: [phone]   Best Call Back Number, if different:  Additional Information:Also ,Need to get a order for repeat lab for today

## 2024-10-28 DIAGNOSIS — Z32.01 POSITIVE URINE PREGNANCY TEST: Primary | ICD-10-CM

## 2025-02-11 ENCOUNTER — OFFICE VISIT (OUTPATIENT)
Dept: FAMILY MEDICINE | Facility: CLINIC | Age: 30
End: 2025-02-11
Payer: OTHER GOVERNMENT

## 2025-02-11 ENCOUNTER — TELEPHONE (OUTPATIENT)
Dept: FAMILY MEDICINE | Facility: CLINIC | Age: 30
End: 2025-02-11
Payer: OTHER GOVERNMENT

## 2025-02-11 VITALS
OXYGEN SATURATION: 98 % | WEIGHT: 276.13 LBS | SYSTOLIC BLOOD PRESSURE: 125 MMHG | TEMPERATURE: 97 F | BODY MASS INDEX: 40.9 KG/M2 | HEART RATE: 93 BPM | DIASTOLIC BLOOD PRESSURE: 79 MMHG | HEIGHT: 69 IN

## 2025-02-11 DIAGNOSIS — R82.71 BACTERIURIA: ICD-10-CM

## 2025-02-11 DIAGNOSIS — M54.9 BACK PAIN, UNSPECIFIED BACK LOCATION, UNSPECIFIED BACK PAIN LATERALITY, UNSPECIFIED CHRONICITY: ICD-10-CM

## 2025-02-11 DIAGNOSIS — R05.9 COUGH, UNSPECIFIED TYPE: Primary | ICD-10-CM

## 2025-02-11 LAB
BILIRUB SERPL-MCNC: ABNORMAL MG/DL
BLOOD URINE, POC: ABNORMAL
CLARITY, UA: CLEAR
COLOR, UA: YELLOW
CTP QC/QA: YES
CTP QC/QA: YES
GLUCOSE UR QL STRIP: ABNORMAL
KETONES UR QL STRIP: ABNORMAL
LEUKOCYTE ESTERASE URINE, POC: ABNORMAL
NITRITE, POC UA: ABNORMAL
PH, POC UA: 7
POC MOLECULAR INFLUENZA A AGN: NEGATIVE
POC MOLECULAR INFLUENZA B AGN: NEGATIVE
PROTEIN, POC: 30
SARS-COV-2 RDRP RESP QL NAA+PROBE: NEGATIVE
SPECIFIC GRAVITY, POC UA: 1.02
UROBILINOGEN, POC UA: 0.2

## 2025-02-11 RX ORDER — CYANOCOBALAMIN (VITAMIN B-12) 500 MCG
400 TABLET ORAL
COMMUNITY

## 2025-02-11 RX ORDER — NAPROXEN SODIUM 220 MG/1
81 TABLET, FILM COATED ORAL
COMMUNITY

## 2025-02-11 RX ORDER — FERROUS SULFATE 325(65) MG
325 TABLET ORAL
COMMUNITY
Start: 2025-01-15 | End: 2025-06-14

## 2025-02-11 RX ORDER — NITROFURANTOIN 25; 75 MG/1; MG/1
100 CAPSULE ORAL 2 TIMES DAILY
Qty: 10 CAPSULE | Refills: 0 | Status: SHIPPED | OUTPATIENT
Start: 2025-02-11 | End: 2025-02-16

## 2025-02-11 NOTE — TELEPHONE ENCOUNTER
----- Message from GARCÍA Painter sent at 2/11/2025  1:50 PM CST -----  Sent macrobid in for bacteriuria. Take twice a day for 5 days

## 2025-02-20 NOTE — PROGRESS NOTES
Subjective     Patient ID: Xenia Pradhan is a 29 y.o. female.    Chief Complaint: Cough, Otalgia, Nausea, Generalized Body Aches, Fever, Nasal Congestion, Headache, Fatigue, and Diarrhea (Exam D)    Presents to clinic with uri and gi symptoms starting recently. She is 20 weeks pregnant and is followed by maternal fetal medicine in [place] and has appt with them next week. Reports some low back pain. Has felt fetal movement. She has been in contact with them regarding symptoms     Review of Systems   Constitutional:  Positive for fatigue and fever. Negative for chills.   HENT:  Positive for nasal congestion and ear pain. Negative for ear discharge, facial swelling, rhinorrhea, sinus pressure/congestion, sneezing and sore throat.    Eyes:  Negative for visual disturbance.   Respiratory:  Positive for cough. Negative for chest tightness, shortness of breath and wheezing.    Gastrointestinal:  Positive for diarrhea and nausea. Negative for abdominal pain and vomiting.   Genitourinary:  Negative for decreased urine volume.   Musculoskeletal:  Negative for myalgias.   Neurological:  Positive for headaches. Negative for weakness.   Hematological:  Negative for adenopathy.          Objective     Physical Exam  Vitals and nursing note reviewed.   Constitutional:       Appearance: Normal appearance. She is normal weight. She is ill-appearing.   HENT:      Nose: Congestion present. No rhinorrhea.   Cardiovascular:      Rate and Rhythm: Normal rate and regular rhythm.      Pulses: Normal pulses.      Heart sounds: Normal heart sounds.   Pulmonary:      Effort: Pulmonary effort is normal.      Breath sounds: Normal breath sounds.   Abdominal:      General: Abdomen is flat. Bowel sounds are normal.      Palpations: Abdomen is soft.   Musculoskeletal:         General: Normal range of motion.      Cervical back: Normal range of motion and neck supple.   Skin:     General: Skin is warm and dry.      Capillary Refill: Capillary  refill takes less than 2 seconds.   Neurological:      General: No focal deficit present.      Mental Status: She is alert and oriented to person, place, and time.          Assessment and Plan     1. Cough, unspecified type  -     POCT COVID-19 Rapid Screening  -     POCT Influenza A/B Molecular    2. Back pain, unspecified back location, unspecified back pain laterality, unspecified chronicity  -     POCT URINALYSIS W/O SCOPE    3. Bacteriuria  -     nitrofurantoin, macrocrystal-monohydrate, (MACROBID) 100 MG capsule; Take 1 capsule (100 mg total) by mouth 2 (two) times daily. for 5 days  Dispense: 10 capsule; Refill: 0        Keep appt with ob gyn   Meds as prescribed  Increase water intake         Follow up if symptoms worsen or fail to improve.    I spent a total of 15 minutes on the day of the visit.This includes face to face time and non-face to face time preparing to see the patient (eg, review of tests), obtaining and/or reviewing separately obtained history, documenting clinical information in the electronic or other health record, independently interpreting results and communicating results to the patient/family/caregiver, or care coordinator.    GARCÍA Painter